# Patient Record
Sex: FEMALE | Race: WHITE | URBAN - METROPOLITAN AREA
[De-identification: names, ages, dates, MRNs, and addresses within clinical notes are randomized per-mention and may not be internally consistent; named-entity substitution may affect disease eponyms.]

---

## 2024-02-19 ENCOUNTER — OV NP (OUTPATIENT)
Dept: URBAN - METROPOLITAN AREA CLINIC 12 | Facility: CLINIC | Age: 34
End: 2024-02-19

## 2024-02-19 VITALS
SYSTOLIC BLOOD PRESSURE: 121 MMHG | DIASTOLIC BLOOD PRESSURE: 70 MMHG | WEIGHT: 152.2 LBS | HEIGHT: 66 IN | HEART RATE: 108 BPM | BODY MASS INDEX: 24.46 KG/M2 | TEMPERATURE: 98.6 F

## 2024-02-19 PROBLEM — 10743008: Status: ACTIVE | Noted: 2024-02-19

## 2024-02-19 RX ORDER — DICYCLOMINE HYDROCHLORIDE 10 MG/1
1 CAPSULE CAPSULE ORAL
Qty: 30 | Refills: 3 | OUTPATIENT
Start: 2024-02-19 | End: 2024-06-18

## 2024-02-19 RX ORDER — DEXTROAMPHETAMINE SACCHARATE, AMPHETAMINE ASPARTATE, DEXTROAMPHETAMINE SULFATE, AND AMPHETAMINE SULFATE 7.5; 7.5; 7.5; 7.5 MG/1; MG/1; MG/1; MG/1
1 TABLET TABLET ORAL TWICE A DAY
Status: ACTIVE | COMMUNITY

## 2024-02-19 NOTE — HPI-TODAY'S VISIT:
34 yo F with anxiety, systemic mastocytosis here for evaluation of abdominal pain.  She says she has had stomach issues forever. Has mid/lower abdominal pain. Unclear if triggered by foods, stress, or mastocytosis.  Endorses some bloating.  Says it feels like a spasm. Worse after eating.  Will get urgency.   Takes TUMS and says it helps. Pepcid also will help.  Had CCY in college. She tried a low fodmap diet -- says it wasn't very helpful. She has flushing, abd pain, anaphylaxis. Her  is a dermatologist.